# Patient Record
Sex: MALE | Race: BLACK OR AFRICAN AMERICAN | URBAN - METROPOLITAN AREA
[De-identification: names, ages, dates, MRNs, and addresses within clinical notes are randomized per-mention and may not be internally consistent; named-entity substitution may affect disease eponyms.]

---

## 2018-09-13 ENCOUNTER — HOSPITAL ENCOUNTER (OUTPATIENT)
Dept: LAB | Age: 73
Discharge: HOME | End: 2018-09-13

## 2018-09-13 PROCEDURE — 88312 SPECIAL STAINS GROUP 1: CPT

## 2018-09-13 PROCEDURE — 88305 TISSUE EXAM BY PATHOLOGIST: CPT

## 2024-05-22 ENCOUNTER — OFFICE VISIT (OUTPATIENT)
Dept: GASTROENTEROLOGY | Age: 79
End: 2024-05-22
Payer: MEDICARE

## 2024-05-22 VITALS
SYSTOLIC BLOOD PRESSURE: 140 MMHG | WEIGHT: 211.8 LBS | BODY MASS INDEX: 31.37 KG/M2 | TEMPERATURE: 96.8 F | HEART RATE: 66 BPM | HEIGHT: 69 IN | DIASTOLIC BLOOD PRESSURE: 77 MMHG | OXYGEN SATURATION: 95 % | RESPIRATION RATE: 16 BRPM

## 2024-05-22 DIAGNOSIS — D64.9 ANEMIA, UNSPECIFIED TYPE: Primary | ICD-10-CM

## 2024-05-22 PROCEDURE — 1123F ACP DISCUSS/DSCN MKR DOCD: CPT | Performed by: STUDENT IN AN ORGANIZED HEALTH CARE EDUCATION/TRAINING PROGRAM

## 2024-05-22 PROCEDURE — 99203 OFFICE O/P NEW LOW 30 MIN: CPT | Performed by: STUDENT IN AN ORGANIZED HEALTH CARE EDUCATION/TRAINING PROGRAM

## 2024-05-22 RX ORDER — AMLODIPINE BESYLATE 2.5 MG/1
5 TABLET ORAL DAILY
COMMUNITY

## 2024-05-22 RX ORDER — ATENOLOL 50 MG/1
50 TABLET ORAL DAILY
COMMUNITY

## 2024-05-22 RX ORDER — ASPIRIN 81 MG/1
81 TABLET, CHEWABLE ORAL DAILY
COMMUNITY

## 2024-05-22 RX ORDER — OMEPRAZOLE 20 MG/1
40 CAPSULE, DELAYED RELEASE ORAL DAILY
COMMUNITY

## 2024-05-23 NOTE — PROGRESS NOTES
Zuleyma Fernandez is 78 y.o. y/o male here for initial evaluation.     Noted that he has labs that are scanned consistent with possible iron deficiency anemia, he reports that he has had black stools recently last month intermittently however this was also in the setting of taking iron tablets.  Denies taking any Pepto-Bismol, reports having EGD and colonoscopy in the past but does not remember the dates or results at this time.    No results found for: \"HGB\", \"WBC\", \"PLT\", \"MCV\", \"IRON\", \"FERRITIN\", \"TIBC\", \"CREATININE\", \"CMP\", \"ALT\", \"AST\"    No past medical history on file.  No past surgical history on file.  No family history on file.  Social History     Tobacco Use    Smoking status: Every Day     Current packs/day: 0.50     Average packs/day: 0.5 packs/day for 66.4 years (33.2 ttl pk-yrs)     Types: Cigarettes     Start date: 1/1/1958    Smokeless tobacco: Never   Substance Use Topics    Alcohol use: Never     No Known Allergies  Current Outpatient Medications   Medication Instructions    amLODIPine (NORVASC) 5 mg, Oral, DAILY    aspirin 81 mg, Oral, DAILY    atenolol (TENORMIN) 50 mg, Oral, DAILY    omeprazole (PRILOSEC) 40 mg, Oral, DAILY     Review of Systems    ROS:    A complete 11 system ROS was performed and was negative aside from the pertinent negative and positives noted above.     PE:   Vitals:    05/22/24 1625   BP: (!) 140/77   Pulse: 66   Resp: 16   Temp: 96.8 °F (36 °C)   SpO2: 95%      General:  The patient appears well-nourished, and is in no acute distress.    Skin:  no rash, ulcers. No Bleeding or signs of infection.  HEENT:  Normocephalic, atraumatic. No sclerae icterus.   Neck:  No pain on palpation or mobilization of the neck.  Respiratory: Respiratory effort is normal. Expansion maintained bilaterally and symmetrically. Normal breath sounds and clear to auscultation bilaterally without wheezes, rales, or rhonchi.    Cardiovascular:  Regular rate and rhythm.     Abdomen:  Soft, non tender

## 2024-05-24 DIAGNOSIS — D64.9 ANEMIA, UNSPECIFIED TYPE: ICD-10-CM

## 2024-05-24 LAB — HEMOCCULT STL QL: NEGATIVE

## 2024-05-28 ENCOUNTER — TELEPHONE (OUTPATIENT)
Dept: GASTROENTEROLOGY | Age: 79
End: 2024-05-28

## 2024-05-28 NOTE — TELEPHONE ENCOUNTER
Called pt and informed him that per Dr. Pollard, his occult stool test was negative and it does not appear his anemia is related to his GI tract. Advised pt that Dr. Pollard will hold off on scopes at this time. Pt verbalized understanding.    ----- Message from Vicki Pollard MD sent at 5/26/2024  1:18 PM EDT -----  We can tell her there is no blood in the stool therefore her anemia is not from GI loss, we will hold off on scopes at this time.